# Patient Record
Sex: FEMALE | Race: WHITE | NOT HISPANIC OR LATINO | URBAN - METROPOLITAN AREA
[De-identification: names, ages, dates, MRNs, and addresses within clinical notes are randomized per-mention and may not be internally consistent; named-entity substitution may affect disease eponyms.]

---

## 2019-12-10 ENCOUNTER — EMERGENCY (EMERGENCY)
Facility: HOSPITAL | Age: 39
LOS: 1 days | Discharge: HOME | End: 2019-12-10
Attending: EMERGENCY MEDICINE | Admitting: EMERGENCY MEDICINE
Payer: MEDICAID

## 2019-12-10 VITALS
OXYGEN SATURATION: 100 % | TEMPERATURE: 98 F | DIASTOLIC BLOOD PRESSURE: 74 MMHG | HEART RATE: 80 BPM | RESPIRATION RATE: 16 BRPM | SYSTOLIC BLOOD PRESSURE: 126 MMHG

## 2019-12-10 PROCEDURE — 99283 EMERGENCY DEPT VISIT LOW MDM: CPT | Mod: 25

## 2019-12-10 PROCEDURE — 64400 NJX AA&/STRD TRIGEMINAL NRV: CPT

## 2019-12-10 RX ORDER — ACETAMINOPHEN 500 MG
975 TABLET ORAL ONCE
Refills: 0 | Status: COMPLETED | OUTPATIENT
Start: 2019-12-10 | End: 2019-12-10

## 2019-12-10 RX ADMIN — Medication 975 MILLIGRAM(S): at 19:53

## 2019-12-10 NOTE — ED PROVIDER NOTE - NSFOLLOWUPCLINICS_GEN_ALL_ED_FT
[facility]  Dental  [address] [place] [id number]  Phone: [phone]  Fax:   Follow Up Time: [facility]  Dentistry  .  [place] [address]  Phone: [phone]  Fax:   Follow Up Time:

## 2019-12-10 NOTE — ED PROVIDER NOTE - OBJECTIVE STATEMENT
38yo F no pmhx pw cc of tooth pain    Had a filling on 11/26, following week on 12/3 a "crack was repaired", started to hurt where "She was injected for numbing" that worsened through Sunday. She went to  on Sunday, given ibuprofen and amoxicillin. Attempted to call back dentist however was told she has to wait two weeks to see if insurance will cover a now necessary root canal.   No F/C, no N/V/D  Dentist Justin Alegria  No meds  NDKA

## 2019-12-10 NOTE — ED PROVIDER NOTE - NS ED ROS FT
CONSTITUTIONAL: No fevers, no chills  SKIN: no rashes.  NEURO: no headache, no weakness or numbness  PSYCHIATRIC: no known mental health issues.

## 2019-12-10 NOTE — ED PROVIDER NOTE - PATIENT PORTAL LINK FT
You can access the FollowMyHealth Patient Portal offered by [facility] by registering at the following website: [email or web address]. By joining [facility]’s FollowMyHealth portal, you will also be able to view your health information using other applications (apps) compatible with our system.

## 2019-12-10 NOTE — ED PROVIDER NOTE - ATTENDING CONTRIBUTION TO CARE
I have personally performed a face to face bedside history and physical examination of this patient. I have discussed the history, examination, review of systems, assessment and plan of management with the resident. I have reviewed the electronic medical record and amended it to reflect my history, review of systems, physical exam, assessment and plan.    40yo F no pmhx pw cc of tooth pain. No evidence of abscess on exam, no trismus, no facial swelling.  VSS AF.  Superior posterior alveolar nerve block performed with 2% lidocaine without complication and reported improvement in pain.  Will d/c with anaglesics.  Patient has plans to follow with endodontist for root canal.

## 2019-12-10 NOTE — ED PROVIDER NOTE - NSFOLLOWUPINSTRUCTIONS_ED_ALL_ED_FT
(1) Follow up with your primary care physician as discussed. In addition, we did not find evidence of a life threatening illness on your testing here today, but listed below are the specialists that will be necessary to see as an outpatient to continue the workup.  Please call the numbers listed below or [phone] to set up the necessary appointments.  (2) Immediately seek care at your nearest emergency room if your worsen, persist, or do not resolve   (3) Take Tylenol (up to 1000mg or 1 g)  and/or Motrin (up to 600mg) up to every 6 hours as needed for pain.   (4) Take the prescribed Percocet as needed for pain up to twice a day

## 2019-12-10 NOTE — ED PROVIDER NOTE - CLINICAL SUMMARY MEDICAL DECISION MAKING FREE TEXT BOX
Rafa PGY-2:  40yo F no pmhx pw cc of tooth pain. No evidence of abscess on exam, improving with abx, will give pain control and d/c to f/u w/ dentist

## 2019-12-10 NOTE — ED PROVIDER NOTE - PROGRESS NOTE DETAILS
Rafa PGY-2:  I have given the pt strict return and follow up precautions. The patient has been provided with a copy of all pertinent results. The patient has been informed of all concerning signs and symptoms to return to Emergency Department, the necessity to follow up with PMD/Clinic/follow up provided within 2-3 days was explained, and the patient reports understanding of above with capacity and insight.

## 2019-12-10 NOTE — ED PROVIDER NOTE - PHYSICAL EXAMINATION
General: well appearing, interactive, well nourished, NAD  HEENT: pupils equal and reactive, normal oropharynx, normal external ears bilaterally, teeth 1 and 2 ttp, no gum swelling or erythema appreciated   Abd: soft, nontender, nondistended